# Patient Record
Sex: FEMALE | URBAN - METROPOLITAN AREA
[De-identification: names, ages, dates, MRNs, and addresses within clinical notes are randomized per-mention and may not be internally consistent; named-entity substitution may affect disease eponyms.]

---

## 2017-02-22 ENCOUNTER — VIRTUAL VISIT (OUTPATIENT)
Dept: FAMILY MEDICINE | Facility: OTHER | Age: 37
End: 2017-02-22

## 2017-02-22 NOTE — PROGRESS NOTES
Date:   Clinician: Noris Combs  Clinician NPI: [id number]  Patient: Shannan Munoz  Patient : 1980  Patient Address: [address] [place] [address], [place] [id number]  Patient Phone: [phone]  Visit Protocol: UTI  Patient Summary:  Shannan is a 36 year old ( : 1980 ) female who initiated a Zip for a presumed bladder infection.     Her symptoms began 2 days ago and consist of loss of appetite, foul smelling urine, dysuria, urgency, and urinary frequency.   Symptom Details   Urinary Frequency: Every 2-3 hours    She denies abdominal pain, vomiting, nausea, hematuria, recent antibiotic use, flank pain, vaginal discharge, chills, fever, urinary incontinence, and hesitation. Shannan has never had kidney stones. She has not been hospitalized, been a patient in a nursing home, or had a catheter in the past two weeks. She denies risk factors for sexually transmitted infections.   Shannan has not had any UTIs in the past 12 months. Her current symptoms are similar to the previous UTI symptoms. She took TMP/Sulfa for her last infection and found it to be effective.   Shannan does not get yeast infections when she takes antibiotics.   She states she is not pregnant and denies breastfeeding. She has menstruated in the past month.   She does NOT smoke or use smokeless tobacco.   Additional information as reported by the patient (free text): I tried to drink lots of water to offset the symptoms but today is worse than the last two.  Also while I have taken sulfa's before without issues, my mom recently found out she is allergic. If an RX is prescribed, I'd rather do something else if possible.  Thank you!   MEDICATIONS:  No current medications , ALLERGIES:  NKDA   Clinician Response:  Dear Shannan,  Based on the information you have provided, you likely have a bladder infection, also called an acute urinary tract infection (UTI).   To treat your infection, I am prescribing:   Nitrofurantoin (Macrobid). Swallow  one (1) tablet twice a day for 5 days. Take the tablet with food. Continue taking the tablets even if you feel better before all the medication is gone. There is no refill with this prescription.   Antibiotic selections by the clinician are based on safety and effectiveness. You may or may not be prescribed the same medication that you took for your last bladder infection.   Some people develop allergies to antibiotics. If you notice a new rash, significant swelling, or difficulty breathing, stop the medication immediately and go into a clinic for physical evaluation.   To help treat your current UTI and prevent future occurrences, remember to:     Drink 8-10, 8-ounce glasses of water daily.    Urinate after sexual intercourse.    Wipe front to back after using the bathroom.     Some women may develop a yeast infection as a side effect of taking antibiotics. If you notice symptoms of a yeast infection, Zipnosis can help treat that condition as well. Simply log in and complete another Zip, which will cover all of the necessary questions to determine the best treatment for you.   You should visit a clinic for a follow-up visit if your symptoms do not improve in 1-2 days or if you experience another urinary tract infection soon after completing this treatment.  If you become pregnant during this course of treatment, stop taking the medication and contact your primary care clinician.   Diagnosis: Acute Uncomplicated Bladder Infection  Diagnosis ICD: N39.0  Prescription: nitrofurantoin (Macrobid) 100mg oral tablet 10 tablets, 5 days supply. Take one tablet by mouth two times a day for 5 days. Refills: 0, Refill as needed: no, Allow substitutions: yes  [facility] [place] - [phone] - [id number] [place] [address]